# Patient Record
Sex: MALE | Race: ASIAN | NOT HISPANIC OR LATINO | Employment: FULL TIME | URBAN - METROPOLITAN AREA
[De-identification: names, ages, dates, MRNs, and addresses within clinical notes are randomized per-mention and may not be internally consistent; named-entity substitution may affect disease eponyms.]

---

## 2017-04-28 ENCOUNTER — NON-PROVIDER VISIT (OUTPATIENT)
Dept: URGENT CARE | Facility: CLINIC | Age: 47
End: 2017-04-28

## 2017-04-28 ENCOUNTER — OFFICE VISIT (OUTPATIENT)
Dept: URGENT CARE | Facility: CLINIC | Age: 47
End: 2017-04-28

## 2017-04-28 DIAGNOSIS — Z01.89 RESPIRATORY CLEARANCE EXAMINATION, ENCOUNTER FOR: ICD-10-CM

## 2017-04-28 DIAGNOSIS — Z29.89 NEED FOR ISOLATION: ICD-10-CM

## 2017-04-28 PROCEDURE — 94375 RESPIRATORY FLOW VOLUME LOOP: CPT | Performed by: NURSE PRACTITIONER

## 2017-04-28 NOTE — PROGRESS NOTES
Watson Brownlee is a 46 y.o. male here for a non-provider visit for Mask Fit/ Respiratory Clearance    If abnormal was an in office provider notified today (if so, indicate provider)? Yes  Routed to PCP? No

## 2017-04-28 NOTE — MR AVS SNAPSHOT
Watson Brownlee   2017 10:45 AM   Office Visit   MRN: [id number]    Department:  [facility]   Dept Phone:  [phone]    Description:  Male : 1970   Provider:  Cathey J Hamman, A.P.N.           Reason for Visit     Other Mask Fit/ Respiratory Clearance      Allergies as of 2017     No Known Allergies      Vital Signs     Smoking Status                   Never Smoker            Basic Information     Date Of Birth Sex Race Ethnicity Preferred Language    1970 Male  Non- English      Your appointments     2017 10:30 AM   Non Provider 1 with [facility] [employer] [facility] (--)    [address], [address]  [place] [address]   [phone]           You will be receiving a confirmation call a few days before your appointment from our automated call confirmation system.            2017 10:45 AM   Provider Consultation with Cathey J Hamman, A.P.N.   [facility] (--)    [address], [address]  [facility] [place] [facility] [address]   [phone]              Health Maintenance     Patient has no pending health maintenance at this time      Current Immunizations     Tdap Vaccine 3/29/2015 12:39 AM      Below and/or attached are the medications your provider expects you to take. Review all of your home medications and newly ordered medications with your provider and/or pharmacist. Follow medication instructions as directed by your provider and/or pharmacist. Please keep your medication list with you and share with your provider. Update the information when medications are discontinued, doses are changed, or new medications (including over-the-counter products) are added; and carry medication information at all times in the event of emergency situations     Allergies:  No Known Allergies          Medications  Valid as of: 2017 -  8:46 AM    Generic Name Brand Name Tablet Size Instructions for use    AmLODIPine Besylate (Tab) NORVASC 5 MG Take   by mouth every day.        Atenolol (Tab) TENORMIN 25 MG Take  by mouth every day.        Cephalexin (Cap) KEFLEX 500 MG Take 1 Cap by mouth 4 times a day.        .                 Medicines prescribed today were sent to:     None      Medication refill instructions:       If your prescription bottle indicates you have medication refills left, it is not necessary to call your provider’s office. Please contact your pharmacy and they will refill your medication.    If your prescription bottle indicates you do not have any refills left, you may request refills at any time through one of the following ways: The online Looklet system (except Urgent Care), by calling your provider’s office, or by asking your pharmacy to contact your provider’s office with a refill request. Medication refills are processed only during regular business hours and may not be available until the next business day. Your provider may request additional information or to have a follow-up visit with you prior to refilling your medication.   *Please Note: Medication refills are assigned a new Rx number when refilled electronically. Your pharmacy may indicate that no refills were authorized even though a new prescription for the same medication is available at the pharmacy. Please request the medicine by name with the pharmacy before contacting your provider for a refill.           Looklet Access Code: [email or web address]  Expires: 5/28/2017  8:46 AM    Looklet  A secure, online tool to manage your health information     [employer] Looklet® is a secure, online tool that connects you to your personalized health information from the privacy of your home -- day or night - making it very easy for you to manage your healthcare. Once the activation process is completed, you can even access your medical information using the Looklet brandie, which is available for free in the Apple Brandie store or Google Play store.     Looklet provides the following levels of  access (as shown below):   My Chart Features   [facility] Doctor [facility]  Specialists [facility]  Urgent  Care Non-[facility]  Primary Care  Doctor   Email your healthcare team securely and privately 24/7 X X X    Manage appointments: schedule your next appointment; view details of past/upcoming appointments X      Request prescription refills. X      View recent personal medical records, including lab and immunizations X X X X   View health record, including health history, allergies, medications X X X X   Read reports about your outpatient visits, procedures, consult and ER notes X X X X   See your discharge summary, which is a recap of your hospital and/or ER visit that includes your diagnosis, lab results, and care plan. X X       How to register for [employer]ious Software:  1. Go to  [email or web address].  2. Click on the Sign Up Now box, which takes you to the New Member Sign Up page. You will need to provide the following information:  a. Enter your v[employer]ious Software Access Code exactly as it appears at the top of this page. (You will not need to use this code after you’ve completed the sign-up process. If you do not sign up before the expiration date, you must request a new code.)   b. Enter your date of birth.   c. Enter your home email address.   d. Click Submit, and follow the next screen’s instructions.  3. Create a vcopious Software ID. This will be your v[employer]ious Software login ID and cannot be changed, so think of one that is secure and easy to remember.  4. Create a [employer] Software password. You can change your password at any time.  5. Enter your Password Reset Question and Answer. This can be used at a later time if you forget your password.   6. Enter your e-mail address. This allows you to receive e-mail notifications when new information is available in [employer] Software.  7. Click Sign Up. You can now view your health information.    For assistance activating your [employer] Software account, call [phone]

## 2017-07-11 ENCOUNTER — OFFICE VISIT (OUTPATIENT)
Dept: URGENT CARE | Facility: PHYSICIAN GROUP | Age: 47
End: 2017-07-11
Payer: COMMERCIAL

## 2017-07-11 VITALS
OXYGEN SATURATION: 97 % | HEART RATE: 102 BPM | BODY MASS INDEX: 26.62 KG/M2 | SYSTOLIC BLOOD PRESSURE: 166 MMHG | WEIGHT: 170 LBS | TEMPERATURE: 98.5 F | DIASTOLIC BLOOD PRESSURE: 94 MMHG

## 2017-07-11 DIAGNOSIS — R05.9 COUGH: ICD-10-CM

## 2017-07-11 DIAGNOSIS — J45.909 REACTIVE AIRWAY DISEASE, UNSPECIFIED ASTHMA SEVERITY, UNCOMPLICATED: Primary | ICD-10-CM

## 2017-07-11 PROCEDURE — 99203 OFFICE O/P NEW LOW 30 MIN: CPT | Performed by: PHYSICIAN ASSISTANT

## 2017-07-11 RX ORDER — METHYLPREDNISOLONE 4 MG/1
4 TABLET ORAL DAILY
Qty: 1 KIT | Refills: 0 | Status: SHIPPED | OUTPATIENT
Start: 2017-07-11

## 2017-07-11 RX ORDER — ALBUTEROL SULFATE 90 UG/1
2 AEROSOL, METERED RESPIRATORY (INHALATION) EVERY 6 HOURS PRN
Qty: 8.5 G | Refills: 0 | Status: SHIPPED | OUTPATIENT
Start: 2017-07-11

## 2017-07-11 RX ORDER — ALBUTEROL SULFATE 2.5 MG/3ML
2.5 SOLUTION RESPIRATORY (INHALATION) ONCE
Status: COMPLETED | OUTPATIENT
Start: 2017-07-11 | End: 2017-07-11

## 2017-07-11 RX ORDER — AMLODIPINE BESYLATE 10 MG/1
10 TABLET ORAL DAILY
COMMUNITY

## 2017-07-11 RX ORDER — VALSARTAN 320 MG/1
320 TABLET ORAL DAILY
COMMUNITY

## 2017-07-11 RX ORDER — ALBUTEROL SULFATE 1.25 MG/3ML
1.25 SOLUTION RESPIRATORY (INHALATION) EVERY 4 HOURS PRN
Qty: 120 ML | Refills: 1 | Status: SHIPPED | OUTPATIENT
Start: 2017-07-11

## 2017-07-11 RX ADMIN — ALBUTEROL SULFATE 2.5 MG: 2.5 SOLUTION RESPIRATORY (INHALATION) at 16:45

## 2017-07-11 ASSESSMENT — ENCOUNTER SYMPTOMS
MUSCULOSKELETAL NEGATIVE: 1
CARDIOVASCULAR NEGATIVE: 1
GASTROINTESTINAL NEGATIVE: 1
COUGH: 1
CONSTITUTIONAL NEGATIVE: 1
EYES NEGATIVE: 1
NEUROLOGICAL NEGATIVE: 1
PSYCHIATRIC NEGATIVE: 1

## 2017-07-11 NOTE — PROGRESS NOTES
Subjective:      Watson Brownlee is a 46 y.o. male who presents with Cough            Cough      Chief Complaint   Patient presents with   • Cough     cough x5days       HPI:  Watson Brownlee is a 46 y.o. male who presents with cough x 5 days.  Worsening with the smoke.  Trying otc cough medicine which did help a little.  Muocus is yellow.  Lots of chest congestion.  Trying to drink more water.  Small amount of runny nose.  Nose mucous is also yellow started 2-3 days ago.  Some wheeze last night.  Lungs feel tight.  Hx of asthma.  No fever or chills.  Patient denies HA, SOB, chest pain, palpitations, fever, chills, or n/v/d.      No past medical history on file.    No past surgical history on file.    No family history on file.    Social History     Social History   • Marital Status:      Spouse Name: N/A   • Number of Children: N/A   • Years of Education: N/A     Occupational History   • Not on file.     Social History Main Topics   • Smoking status: Never Smoker    • Smokeless tobacco: Never Used   • Alcohol Use: Yes   • Drug Use: Not on file   • Sexual Activity: Not on file     Other Topics Concern   • Not on file     Social History Narrative   • No narrative on file         Current outpatient prescriptions:   •  valsartan, 320 mg, Oral, DAILY  •  amlodipine, 10 mg, Oral, DAILY  •  cephALEXin, 500 mg, Oral, 4X/DAY  •  amlodipine, Take  by mouth every day.  •  atenolol, Take  by mouth every day., 3/28/2015 at Unknown    No Known Allergies         Review of Systems   Constitutional: Negative.    HENT: Negative.    Eyes: Negative.    Respiratory: Positive for cough.    Cardiovascular: Negative.    Gastrointestinal: Negative.    Genitourinary: Negative.    Musculoskeletal: Negative.    Skin: Negative.    Neurological: Negative.    Endo/Heme/Allergies: Negative.    Psychiatric/Behavioral: Negative.           Objective:     /94 mmHg  Pulse 102  Temp(Src) 36.9 °C (98.5 °F)  Wt 77.111 kg (170 lb)  SpO2 97%      Physical Exam       Nursing note and vitals reviewed.    Constitutional:   Appropriately groomed, pleasant affect, well nourished, in NAD.    Head:   Normocephalic, atraumatic.    Eyes:   PERRLA, EOM's full, sclera white, conjunctiva not erythematous, and medial canthus without exudate bilaterally.    Ears:  Auricle and tragus non-tender to manipulation.  No pre-auricular lymphadenopathy or mastoid ttp.  EACs with mild cerumen bilaterally, not erythematous.  TM’s pearly gray with cone of light present and umbo and malleolus visible bilaterally.  No bulging or fluid bubbles present in middle ear.  Hearing grossly intact to voice.    Nose:  Nares not patent bilaterally.  Nasal mucosa edematous with white rhinorrhea bilaterally.  Mild sinus tenderness to percussion.    Throat:  Dentition wnl, mucosa moist without lesions.  Oropharynx mildly erythematous, with no enlargement of the palatine tonsils bilaterally with no exudates.    Post nasal drainage  present.  Soft palate rises symmetrically bilaterally and uvula midline.      Neck: Neck supple, with mild anterior lymphadenopathy that is soft and mobile to palpation. Thyroid non-palpable without tenderness or nodules. No supraclavicular lymphadenopathy.    Lungs:  Respiratory effort not labored without accessory muscle use.  Lungs with inspiratory wheezes to auscultation. No rales. Rhonchi cleared with cough.    Heart:  RRR, without murmurs rubs or gallops.  Radial and dorsalis pedis pulse 2+ bilaterally.  No LE edema.    Musculoskeletal:  Gait non-antalgic with a narrow base.    Derm:  Skin without rashes or lesions with good turgor pressure.      Psychiatric:  Mood, affect, and judgement appropriate.     Assessment/Plan:     1. Reactive airway disease, unspecified asthma severity, uncomplicated  albuterol 108 (90 BASE) MCG/ACT Aero Soln inhalation aerosol    albuterol (ACCUNEB) 1.25 MG/3ML nebulizer solution    Hydrocod Polst-CPM Polst ER (TUSSIONEX PENNKINETIC  ER) 10-8 MG/5ML Suspension Extended Release   2. Cough  albuterol (PROVENTIL) 2.5mg/3ml nebulizer solution 2.5 mg    MethylPREDNISolone (MEDROL DOSEPAK) 4 MG Tablet Therapy Pack    albuterol 108 (90 BASE) MCG/ACT Aero Soln inhalation aerosol    albuterol (ACCUNEB) 1.25 MG/3ML nebulizer solution    Hydrocod Polst-CPM Polst ER (TUSSIONEX PENNKINETIC ER) 10-8 MG/5ML Suspension Extended Release     Patient presents with cough ×5 days now with yellow sputum changes. Patient does describe intermittent mucous changes. He has tried over-the-counter cough syrups. No fever or chills, mild runny nose. On exam patient is afebrile but does have an elevated blood pressure and slight tachycardia. Patient denies any chest pain or pressure. On exam patient does have tight lung sounds but no crackles. Slight inspiratory wheeze. Post albuterol nebulizer, patient's lung sounds significantly improve her oxygen saturation still at 97% room air. Suspect reactive airway and recommended pushing fluids, and prescribed steroid pulse with taper, albuterol handheld for work, nebulizer for home 2 times daily and recommended follow with PCP in one to 2 weeks. Reviewed signs and symptoms of bacterial infection when to return to clinic. Did prescribe cough present for bedtime use only.    Narcotic use report obtained with no indication of narcotic overuse or misuse and deemed necessary for treatment. Sedation, dependence, and constipation precautions given. Avoid use while driving or operating heavy machinery.     Patient was in agreement with this treatment plan and seemed to understand without barriers. All questions were encouraged and answered.  Reviewed signs and symptoms of when to seek emergency medical care.     Please note that this dictation was created using voice recognition software.  I have made every reasonable attempt to correct obvious errors, but I expect there are errors of janet and possibly content that I did not discover  before finalizing the note.

## 2017-07-12 NOTE — PATIENT INSTRUCTIONS
Flonase and nasal saline irrigation (netti pot or Mauricio Med Sinus Rinse).  Used distilled water or boiled tap water with nasal flushes, not straight tap water.  Humidifier at bedtime.  Hot steam showers to loosen up mucous.  Cough medicine at bedtime.  Lots of fluids, tea with honey.  Start the steroid when going to work.  Albuterol handheld inhaler every 4 hours.  Nebulizer 1-2 times a day while at home.  Return if worsening: Yellow thicker mucus changes, worsening pain around the eyes and radiates to the teeth, and fever over 101°F.    Asthma, Acute Bronchospasm  Acute bronchospasm caused by asthma is also referred to as an asthma attack. Bronchospasm means your air passages become narrowed. The narrowing is caused by inflammation and tightening of the muscles in the air tubes (bronchi) in your lungs. This can make it hard to breathe or cause you to wheeze and cough.  CAUSES  Possible triggers are:  · Animal dander from the skin, hair, or feathers of animals.  · Dust mites contained in house dust.  · Cockroaches.  · Pollen from trees or grass.  · Mold.  · Cigarette or tobacco smoke.  · Air pollutants such as dust, household , hair sprays, aerosol sprays, paint fumes, strong chemicals, or strong odors.  · Cold air or weather changes. Cold air may trigger inflammation. Winds increase molds and pollens in the air.  · Strong emotions such as crying or laughing hard.  · Stress.  · Certain medicines such as aspirin or beta-blockers.  · Sulfites in foods and drinks, such as dried fruits and wine.  · Infections or inflammatory conditions, such as a flu, cold, or inflammation of the nasal membranes (rhinitis).  · Gastroesophageal reflux disease (GERD). GERD is a condition where stomach acid backs up into your esophagus.  · Exercise or strenuous activity.  SIGNS AND SYMPTOMS   · Wheezing.  · Excessive coughing, particularly at night.  · Chest tightness.  · Shortness of breath.  DIAGNOSIS   Your health care provider  will ask you about your medical history and perform a physical exam. A chest X-ray or blood testing may be performed to look for other causes of your symptoms or other conditions that may have triggered your asthma attack.   TREATMENT   Treatment is aimed at reducing inflammation and opening up the airways in your lungs.  Most asthma attacks are treated with inhaled medicines. These include quick relief or rescue medicines (such as bronchodilators) and controller medicines (such as inhaled corticosteroids). These medicines are sometimes given through an inhaler or a nebulizer. Systemic steroid medicine taken by mouth or given through an IV tube also can be used to reduce the inflammation when an attack is moderate or severe. Antibiotic medicines are only used if a bacterial infection is present.   HOME CARE INSTRUCTIONS   · Rest.  · Drink plenty of liquids. This helps the mucus to remain thin and be easily coughed up. Only use caffeine in moderation and do not use alcohol until you have recovered from your illness.  · Do not smoke. Avoid being exposed to secondhand smoke.  · You play a critical role in keeping yourself in good health. Avoid exposure to things that cause you to wheeze or to have breathing problems.  · Keep your medicines up-to-date and available. Carefully follow your health care provider's treatment plan.  · Take your medicine exactly as prescribed.  · When pollen or pollution is bad, keep windows closed and use an air conditioner or go to places with air conditioning.  · Asthma requires careful medical care. See your health care provider for a follow-up as advised. If you are more than 24 weeks pregnant and you were prescribed any new medicines, let your obstetrician know about the visit and how you are doing. Follow up with your health care provider as directed.  · After you have recovered from your asthma attack, make an appointment with your outpatient doctor to talk about ways to reduce the  likelihood of future attacks. If you do not have a doctor who manages your asthma, make an appointment with a primary care doctor to discuss your asthma.  SEEK IMMEDIATE MEDICAL CARE IF:   · You are getting worse.  · You have trouble breathing. If severe, call your local emergency services (911 in the [place]  · You develop chest pain or discomfort.  · You are vomiting.  · You are not able to keep fluids down.  · You are coughing up yellow, green, brown, or bloody sputum.  · You have a fever and your symptoms suddenly get worse.  · You have trouble swallowing.  MAKE SURE YOU:   · Understand these instructions.  · Will watch your condition.  · Will get help right away if you are not doing well or get worse.     This information is not intended to replace advice given to you by your health care provider. Make sure you discuss any questions you have with your health care provider.     Document Released: 04/03/2008 Document Revised: 12/23/2014 Document Reviewed: 06/25/2014  [employer] Interactive Patient Education ©2016 [employer]